# Patient Record
Sex: MALE | Race: BLACK OR AFRICAN AMERICAN | NOT HISPANIC OR LATINO | Employment: FULL TIME | URBAN - METROPOLITAN AREA
[De-identification: names, ages, dates, MRNs, and addresses within clinical notes are randomized per-mention and may not be internally consistent; named-entity substitution may affect disease eponyms.]

---

## 2021-01-09 ENCOUNTER — HOSPITAL ENCOUNTER (EMERGENCY)
Facility: HOSPITAL | Age: 36
Discharge: HOME | End: 2021-01-09
Attending: EMERGENCY MEDICINE | Admitting: EMERGENCY MEDICINE
Payer: OTHER MISCELLANEOUS

## 2021-01-09 ENCOUNTER — APPOINTMENT (EMERGENCY)
Dept: RADIOLOGY | Facility: HOSPITAL | Age: 36
End: 2021-01-09
Payer: OTHER MISCELLANEOUS

## 2021-01-09 VITALS
OXYGEN SATURATION: 99 % | SYSTOLIC BLOOD PRESSURE: 157 MMHG | RESPIRATION RATE: 18 BRPM | TEMPERATURE: 98.6 F | HEART RATE: 82 BPM | WEIGHT: 208 LBS | DIASTOLIC BLOOD PRESSURE: 100 MMHG

## 2021-01-09 DIAGNOSIS — S63.259A DISLOCATION OF FINGER, INITIAL ENCOUNTER: Primary | ICD-10-CM

## 2021-01-09 PROCEDURE — 99284 EMERGENCY DEPT VISIT MOD MDM: CPT | Performed by: PHYSICIAN ASSISTANT

## 2021-01-09 PROCEDURE — 99283 EMERGENCY DEPT VISIT LOW MDM: CPT

## 2021-01-09 PROCEDURE — 26770 TREAT FINGER DISLOCATION: CPT | Performed by: PHYSICIAN ASSISTANT

## 2021-01-09 PROCEDURE — 73140 X-RAY EXAM OF FINGER(S): CPT

## 2021-01-09 RX ADMIN — IBUPROFEN 400 MG: 100 SUSPENSION ORAL at 04:33

## 2021-01-09 NOTE — Clinical Note
Vianca Silvestre was seen and treated in our emergency department on 1/9/2021  Diagnosis:     Yolanda Maxwell  may return to work on return date  He may return on this date: 01/11/2021         If you have any questions or concerns, please don't hesitate to call        Saba Burt PA-C    ______________________________           _______________          _______________  Hospital Representative                              Date                                Time

## 2021-01-09 NOTE — ED PROVIDER NOTES
EMERGENCY MEDICINE NOTE        PATIENT IDENTIFICATION PHYSICIAN/SERVICE INFORMATION   Name: Ruby Mckeon  MRN: [id number]  [place]: 1985  Age/Sex: 38 y o  male  Preferred Language: English  Code Status: No Order  Encounter Date: 1/9/2021  Attending Physician: Kashif Robles MD  Admitting Physician: No admitting provider for patient encounter  Primary Care Physician: No primary care provider on file  Primary Care Phone: None       CHIEF COMPLAINT     Chief Complaint   Patient presents with    Finger Injury     patient dropped tire on left fore finger, less than 1 hour ago         HISTORY OF PRESENT ILLNESS       History provided by:  Patient   used: No    Hand Pain  Location:  L 2nd finger  Quality:  "pain"  Severity:  Mild  Onset quality:  Sudden  Duration:  1 hour  Timing:  Constant  Progression:  Unchanged  Chronicity:  New  Context:  Patient was at work as a technician, accidentally dropped tire on left finger, less than 1 hour ago with deformity, pain since--directed to come to ED for eval  No other injury/trauma  No numbness/tingling  Relieved by:  Nothing tried  Worsened by: Movement  Ineffective treatments:  Nothing tried  Associated symptoms: no abdominal pain, no chest pain, no cough, no fatigue, no fever, no headaches, no rash and no shortness of breath          PAST MEDICAL AND SURGICAL HISTORY     History reviewed  No pertinent past medical history  History reviewed  No pertinent surgical history  History reviewed  No pertinent family history      E-Cigarette/Vaping     E-Cigarette/Vaping Substances     Social History     Tobacco Use    Smoking status: Never Smoker    Smokeless tobacco: Never Used   Substance Use Topics    Alcohol use: Not Currently    Drug use: Never         ALLERGIES     No Known Allergies      HOME MEDICATIONS     None         REVIEW OF SYSTEMS     Review of Systems   Constitutional: Negative for activity change, appetite change, chills, diaphoresis, fatigue and fever  Respiratory: Negative for cough and shortness of breath  Cardiovascular: Negative for chest pain  Gastrointestinal: Negative for abdominal pain  Musculoskeletal: Positive for arthralgias  Skin: Negative for rash  Neurological: Negative for headaches  All other systems reviewed and are negative  PHYSICAL EXAMINATION     ED Triage Vitals   Temperature Pulse Respirations Blood Pressure SpO2   01/09/21 0316 01/09/21 0314 01/09/21 0314 01/09/21 0314 01/09/21 0316   98 6 °F (37 °C) 82 18 157/100 99 %      Temp src Heart Rate Source Patient Position - Orthostatic VS BP Location FiO2 (%)   -- 01/09/21 0314 01/09/21 0314 01/09/21 0314 --    Monitor Sitting Right arm       Pain Score       --                Wt Readings from Last 3 Encounters:   01/09/21 94 3 kg (208 lb)         Physical Exam  Vitals signs and nursing note reviewed  Constitutional:       General: He is not in acute distress  Appearance: He is well-developed  He is not ill-appearing, toxic-appearing or diaphoretic  HENT:      Head: Normocephalic and atraumatic  Mouth/Throat:      Mouth: Mucous membranes are moist    Eyes:      Conjunctiva/sclera: Conjunctivae normal       Pupils: Pupils are equal, round, and reactive to light  Neck:      Musculoskeletal: Normal range of motion and neck supple  Cardiovascular:      Rate and Rhythm: Normal rate and regular rhythm  Heart sounds: Normal heart sounds  No murmur  No friction rub  No gallop  Pulmonary:      Effort: Pulmonary effort is normal  No respiratory distress  Breath sounds: Normal breath sounds  No wheezing or rales  Chest:      Chest wall: No tenderness  Musculoskeletal:      Left hand: He exhibits decreased range of motion (L index PIP), tenderness, bony tenderness and deformity (L index PIP dislocation)  He exhibits normal two-point discrimination, normal capillary refill, no laceration and no swelling   Normal sensation noted  Normal strength noted  Skin:     General: Skin is warm  Capillary Refill: Capillary refill takes less than 2 seconds  Neurological:      Mental Status: He is alert and oriented to person, place, and time  DIAGNOSTIC RESULTS     Laboratory results:    Labs Reviewed - No data to display    All labs reviewed and utilized in the medical decision making process    Radiology results:    XR finger second digit-index LEFT   ED Interpretation   No obvious fx  Successful post reduction film      Final Result      Soft tissue swelling at the 2nd PIP joint  No fracture is identified  Workstation performed: [id number]             All radiology studies independently viewed by me and interpreted by the radiologist       PROCEDURES     Orthopedic injury treatment    Date/Time: 1/9/2021 4:30 AM  Performed by: Aiden Rose PA-C  Authorized by:  Aiden Rose PA-C     Patient Location:  ED  Other Assisting Provider: No    Verbal consent obtained?: Yes    Risks and benefits: Risks, benefits and alternatives were discussed    Consent given by:  Patient  Patient identity confirmed:  Verbally with patient, arm band and hospital-assigned identification number  Injury location:  Finger  Location details:  Left index finger  Injury type:  Dislocation  Dislocation type: PIP    Neurovascular status: Neurovascularly intact    Distal perfusion: normal    Neurological function: normal    Range of motion: reduced    Local anesthesia used?: No    General anesthesia used?: No    Manipulation performed?: Yes    Reduction successful?: Yes    Confirmation: Reduction confirmed by x-ray    Immobilization:  Splint  Splint type:  Finger splint, static  Neurovascular status: Neurovascularly intact    Distal perfusion: normal    Neurological function: normal    Range of motion: improved    Patient tolerance:  Patient tolerated the procedure well with no immediate complications          Invasive Devices None                 ASSESSMENT AND PLAN     MDM  Number of Diagnoses or Management Options  Dislocation of finger, initial encounter: new, needed workup     Amount and/or Complexity of Data Reviewed  Tests in the radiology section of CPT®: ordered and reviewed  Review and summarize past medical records: yes  Independent visualization of images, tracings, or specimens: yes    Risk of Complications, Morbidity, and/or Mortality  Presenting problems: moderate  Diagnostic procedures: moderate  Management options: moderate    Patient Progress  Patient progress: improved      Initial ED assessment:  Ya Vega is a 38 y o  male with no significant PMH who presents with finger injury  Vitals signs reviewed and WNL  Physical examination is remarkable for dislocation deformity at L index PIP    Initial Ddx  includes but is not limited to:   paronychia, cellulitis, abscess, onychomycosis (tinea unguium), laceration, abrasion, flexor tenosynovitis, herpetic rosas, subungal hematoma, sprain, strain, contusion, dislocation, fracture, jersey finger, trigger finger, tendinitis, subluxation, gout, arthritis  Initial ED plan:   Plan will be to perform diagnostic testing of XR of finger and treat symptomatically   Final ED summary/disposition: Discussed results of diagnostic testing with Patient in detail  Greater than 10 minutes of education regarding diagnosis, testing, and ample opportunity for questions  Home care recommendations given with discharge paperwork  Return to ED instructions given if new/worsening sxs  Verbalized understanding      MDM  Reviewed: previous chart, nursing note and vitals  Interpretation: x-ray          ED COURSE OF CARE AND REASSESSMENT                                          Medications   ibuprofen (MOTRIN) oral suspension 400 mg (400 mg Oral Given 1/9/21 1642)         FINAL IMPRESSION     Final diagnoses:   Dislocation of finger, initial encounter         Avani Sandoval Time reflects when diagnosis was documented in both MDM as applicable and the Disposition within this note     Time User Action Codes Description Comment    1/9/2021  3:55 AM Augusto Ford Add [[id number]] Dislocation of finger, initial encounter       ED Disposition     ED Disposition Condition Date/Time Comment    Discharge Stable Sat Jan 9, 2021  3:56 AM Jesica Aguirre discharge to home/self care  Follow-up Information     Follow up With Specialties Details Why Contact Info Additional Information    Your workman comp  Go in 1 week For follow up visit in 1 week      [address] [facility] Call  For follow up, As needed Jazmine Thompson [facility] [address] [facility] [address], [address] [facility]    [address] [facility] Emergency Medicine Go to  If symptoms worsen [address] [address] [phone],  [address] [facility], [place], [place], [facility]              PATIENT REFERRAL     Your workman comp    Go in 1 week  For follow up visit in 1 week    [address] [facility]  Jazmine [address] [place] [address]  Call   For follow up, As needed    [address] [facility] [address] [phone]  Go to   If symptoms worsen        DISCHARGE MEDICATIONS     There are no discharge medications for this patient  No discharge procedures on file      PDMP Review     None          NEWTON Henderson PA-C  01/12/21 0602